# Patient Record
Sex: MALE | Race: WHITE | Employment: FULL TIME | URBAN - METROPOLITAN AREA
[De-identification: names, ages, dates, MRNs, and addresses within clinical notes are randomized per-mention and may not be internally consistent; named-entity substitution may affect disease eponyms.]

---

## 2017-06-15 ENCOUNTER — HOSPITAL ENCOUNTER (OUTPATIENT)
Age: 32
Discharge: HOME | End: 2017-06-15

## 2017-06-15 ENCOUNTER — HOSPITAL ENCOUNTER (OUTPATIENT)
Dept: GENERAL RADIOLOGY | Age: 32
Discharge: HOME | End: 2017-06-15

## 2017-06-15 DIAGNOSIS — Z00.00 PHYSICAL EXAM: ICD-10-CM

## 2017-06-15 LAB
ABSOLUTE EOS #: 0.2 K/UL (ref 0–0.4)
ABSOLUTE LYMPH #: 3.2 K/UL (ref 1–4.8)
ABSOLUTE MONO #: 0.5 K/UL (ref 0.1–1.2)
ALBUMIN SERPL-MCNC: 4.8 G/DL (ref 3.5–5.2)
ALBUMIN/GLOBULIN RATIO: 1.7 (ref 1–2.5)
ALP BLD-CCNC: 44 U/L (ref 40–129)
ALT SERPL-CCNC: 24 U/L (ref 5–41)
ANION GAP SERPL CALCULATED.3IONS-SCNC: 15 MMOL/L (ref 9–17)
AST SERPL-CCNC: 21 U/L
BASOPHILS # BLD: 0 %
BASOPHILS ABSOLUTE: 0 K/UL (ref 0–0.2)
BILIRUB SERPL-MCNC: 0.48 MG/DL (ref 0.3–1.2)
BUN BLDV-MCNC: 15 MG/DL (ref 6–20)
BUN/CREAT BLD: NORMAL (ref 9–20)
CALCIUM SERPL-MCNC: 9.6 MG/DL (ref 8.6–10.4)
CHLORIDE BLD-SCNC: 99 MMOL/L (ref 98–107)
CHOLESTEROL/HDL RATIO: 2.5
CHOLESTEROL: 149 MG/DL
CO2: 26 MMOL/L (ref 20–31)
CREAT SERPL-MCNC: 0.78 MG/DL (ref 0.7–1.2)
DIFFERENTIAL TYPE: NORMAL
EOSINOPHILS RELATIVE PERCENT: 3 %
GFR AFRICAN AMERICAN: >60 ML/MIN
GFR NON-AFRICAN AMERICAN: >60 ML/MIN
GFR SERPL CREATININE-BSD FRML MDRD: NORMAL ML/MIN/{1.73_M2}
GFR SERPL CREATININE-BSD FRML MDRD: NORMAL ML/MIN/{1.73_M2}
GLOBULIN: NORMAL G/DL (ref 1.5–3.8)
GLUCOSE BLD-MCNC: 84 MG/DL (ref 70–99)
HCT VFR BLD CALC: 43.2 % (ref 41–53)
HDLC SERPL-MCNC: 59 MG/DL
HEMOGLOBIN: 15.2 G/DL (ref 13.5–17.5)
IRON: 85 UG/DL (ref 59–158)
LACTATE DEHYDROGENASE: 174 U/L (ref 135–225)
LDL CHOLESTEROL: 78 MG/DL (ref 0–130)
LYMPHOCYTES # BLD: 47 %
MCH RBC QN AUTO: 29.8 PG (ref 26–34)
MCHC RBC AUTO-ENTMCNC: 35.1 G/DL (ref 31–37)
MCV RBC AUTO: 84.9 FL (ref 80–100)
MONOCYTES # BLD: 8 %
PDW BLD-RTO: 13.1 % (ref 12.5–15.4)
PHOSPHORUS: 3.9 MG/DL (ref 2.5–4.5)
PLATELET # BLD: 244 K/UL (ref 140–450)
PLATELET ESTIMATE: NORMAL
PMV BLD AUTO: 7.8 FL (ref 6–12)
POTASSIUM SERPL-SCNC: 3.8 MMOL/L (ref 3.7–5.3)
RBC # BLD: 5.09 M/UL (ref 4.5–5.9)
RBC # BLD: NORMAL 10*6/UL
SEG NEUTROPHILS: 42 %
SEGMENTED NEUTROPHILS ABSOLUTE COUNT: 2.8 K/UL (ref 1.8–7.7)
SODIUM BLD-SCNC: 140 MMOL/L (ref 135–144)
TOTAL PROTEIN: 7.7 G/DL (ref 6.4–8.3)
TRIGL SERPL-MCNC: 60 MG/DL
URIC ACID: 6.3 MG/DL (ref 3.4–7)
VLDLC SERPL CALC-MCNC: NORMAL MG/DL (ref 1–30)
WBC # BLD: 6.7 K/UL (ref 3.5–11)
WBC # BLD: NORMAL 10*3/UL

## 2017-06-15 PROCEDURE — 84550 ASSAY OF BLOOD/URIC ACID: CPT

## 2017-06-15 PROCEDURE — 80061 LIPID PANEL: CPT

## 2017-06-15 PROCEDURE — 86481 TB AG RESPONSE T-CELL SUSP: CPT

## 2017-06-15 PROCEDURE — 84100 ASSAY OF PHOSPHORUS: CPT

## 2017-06-15 PROCEDURE — 83540 ASSAY OF IRON: CPT

## 2017-06-15 PROCEDURE — 83615 LACTATE (LD) (LDH) ENZYME: CPT

## 2017-06-15 PROCEDURE — 85025 COMPLETE CBC W/AUTO DIFF WBC: CPT

## 2017-06-15 PROCEDURE — 71010 XR CHEST LIMITED: CPT

## 2017-06-15 PROCEDURE — 80053 COMPREHEN METABOLIC PANEL: CPT

## 2017-06-18 LAB — T-SPOT TB TEST: NORMAL

## 2018-09-05 ENCOUNTER — HOSPITAL ENCOUNTER (OUTPATIENT)
Age: 33
Discharge: HOME | End: 2018-09-05
Payer: COMMERCIAL

## 2018-09-05 LAB
FOLLICLE STIMULATING HORMONE: 3.8 U/L (ref 1.5–12.4)
LH: 5.3 U/L (ref 1.7–8.6)
PROLACTIN: 5.36 UG/L (ref 4.04–15.2)
SEX HORMONE BINDING GLOBULIN: 33 NMOL/L (ref 11–80)
TESTOSTERONE FREE-NONMALE: 51.5 PG/ML (ref 47–244)
TESTOSTERONE TOTAL: 265 NG/DL (ref 220–1000)
TSH SERPL DL<=0.05 MIU/L-ACNC: 1.6 MIU/L (ref 0.3–5)

## 2018-09-05 PROCEDURE — 83002 ASSAY OF GONADOTROPIN (LH): CPT

## 2018-09-05 PROCEDURE — 84146 ASSAY OF PROLACTIN: CPT

## 2018-09-05 PROCEDURE — 36415 COLL VENOUS BLD VENIPUNCTURE: CPT

## 2018-09-05 PROCEDURE — 84443 ASSAY THYROID STIM HORMONE: CPT

## 2018-09-05 PROCEDURE — 84270 ASSAY OF SEX HORMONE GLOBUL: CPT

## 2018-09-05 PROCEDURE — 83001 ASSAY OF GONADOTROPIN (FSH): CPT

## 2018-09-05 PROCEDURE — 84403 ASSAY OF TOTAL TESTOSTERONE: CPT

## 2018-11-25 ENCOUNTER — HOSPITAL ENCOUNTER (EMERGENCY)
Age: 33
Discharge: HOME | End: 2018-11-25
Attending: EMERGENCY MEDICINE
Payer: COMMERCIAL

## 2018-11-25 VITALS
TEMPERATURE: 98.1 F | HEART RATE: 77 BPM | SYSTOLIC BLOOD PRESSURE: 134 MMHG | DIASTOLIC BLOOD PRESSURE: 87 MMHG | OXYGEN SATURATION: 96 % | RESPIRATION RATE: 20 BRPM

## 2018-11-25 DIAGNOSIS — S06.0X0A CONCUSSION WITHOUT LOSS OF CONSCIOUSNESS, INITIAL ENCOUNTER: ICD-10-CM

## 2018-11-25 DIAGNOSIS — S16.1XXA STRAIN OF NECK MUSCLE, INITIAL ENCOUNTER: ICD-10-CM
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
## 2018-11-25 DIAGNOSIS — S80.12XA CONTUSION OF LEFT CALF, INITIAL ENCOUNTER: ICD-10-CM

## 2018-11-25 PROCEDURE — 99283 EMERGENCY DEPT VISIT LOW MDM: CPT

## 2018-11-25 PROCEDURE — 90715 TDAP VACCINE 7 YRS/> IM: CPT | Performed by: EMERGENCY MEDICINE

## 2018-11-25 PROCEDURE — 6360000002 HC RX W HCPCS: Performed by: EMERGENCY MEDICINE

## 2018-11-25 PROCEDURE — 90471 IMMUNIZATION ADMIN: CPT | Performed by: EMERGENCY MEDICINE

## 2018-11-25 PROCEDURE — 96372 THER/PROPH/DIAG INJ SC/IM: CPT

## 2018-11-25 RX ORDER — KETOROLAC TROMETHAMINE 30 MG/ML
30 INJECTION, SOLUTION INTRAMUSCULAR; INTRAVENOUS ONCE
Status: COMPLETED | OUTPATIENT
Start: 2018-11-25 | End: 2018-11-25

## 2018-11-25 RX ADMIN — KETOROLAC TROMETHAMINE 30 MG: 30 INJECTION, SOLUTION INTRAMUSCULAR at 01:05

## 2018-11-25 RX ADMIN — TETANUS TOXOID, REDUCED DIPHTHERIA TOXOID AND ACELLULAR PERTUSSIS VACCINE, ADSORBED 0.5 ML: 5; 2.5; 8; 8; 2.5 SUSPENSION INTRAMUSCULAR at 02:11

## 2018-11-25 ASSESSMENT — ENCOUNTER SYMPTOMS
VOMITING: 0
SHORTNESS OF BREATH: 0
DIARRHEA: 0
ABDOMINAL PAIN: 0
EYE PAIN: 0
SORE THROAT: 0
COUGH: 0
EYE DISCHARGE: 0
NAUSEA: 0

## 2018-11-25 ASSESSMENT — PAIN DESCRIPTION - LOCATION: LOCATION: CHEST

## 2018-11-25 ASSESSMENT — PAIN SCALES - GENERAL
PAINLEVEL_OUTOF10: 5
PAINLEVEL_OUTOF10: 5

## 2018-11-25 ASSESSMENT — PAIN DESCRIPTION - PAIN TYPE: TYPE: ACUTE PAIN

## 2018-11-25 ASSESSMENT — PAIN DESCRIPTION - DESCRIPTORS: DESCRIPTORS: ACHING

## 2018-11-25 ASSESSMENT — PAIN DESCRIPTION - ORIENTATION: ORIENTATION: MID

## 2018-11-25 NOTE — PROGRESS NOTES
I was assigned to this patient in error. I did not see this patient or participate in their care.     Dahlia Lew DO  11/25/2018 12:42 AM
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
## 2020-04-18 ENCOUNTER — HOSPITAL ENCOUNTER (EMERGENCY)
Age: 35
Discharge: HOME | End: 2020-04-18
Attending: EMERGENCY MEDICINE
Payer: COMMERCIAL

## 2020-04-18 ENCOUNTER — APPOINTMENT (OUTPATIENT)
Dept: GENERAL RADIOLOGY | Age: 35
End: 2020-04-18
Payer: COMMERCIAL

## 2020-04-18 VITALS
HEIGHT: 70 IN | RESPIRATION RATE: 16 BRPM | BODY MASS INDEX: 28.63 KG/M2 | TEMPERATURE: 98.9 F | OXYGEN SATURATION: 100 % | HEART RATE: 94 BPM | SYSTOLIC BLOOD PRESSURE: 175 MMHG | WEIGHT: 200 LBS | DIASTOLIC BLOOD PRESSURE: 105 MMHG

## 2020-04-18 PROCEDURE — 73562 X-RAY EXAM OF KNEE 3: CPT

## 2020-04-18 PROCEDURE — 6370000000 HC RX 637 (ALT 250 FOR IP): Performed by: EMERGENCY MEDICINE

## 2020-04-18 PROCEDURE — 99283 EMERGENCY DEPT VISIT LOW MDM: CPT

## 2020-04-18 RX ORDER — ACETAMINOPHEN 325 MG/1
650 TABLET ORAL ONCE
Status: COMPLETED | OUTPATIENT
Start: 2020-04-18 | End: 2020-04-18

## 2020-04-18 RX ORDER — IBUPROFEN 800 MG/1
800 TABLET ORAL ONCE
Status: COMPLETED | OUTPATIENT
Start: 2020-04-18 | End: 2020-04-18

## 2020-04-18 RX ORDER — IBUPROFEN 800 MG/1
800 TABLET ORAL EVERY 8 HOURS PRN
Qty: 30 TABLET | Refills: 0 | Status: SHIPPED | OUTPATIENT
Start: 2020-04-18

## 2020-04-18 RX ADMIN — IBUPROFEN 800 MG: 800 TABLET, FILM COATED ORAL at 22:09

## 2020-04-18 RX ADMIN — ACETAMINOPHEN 650 MG: 325 TABLET ORAL at 22:09

## 2020-04-18 SDOH — HEALTH STABILITY: MENTAL HEALTH: HOW OFTEN DO YOU HAVE A DRINK CONTAINING ALCOHOL?: NEVER

## 2020-04-18 ASSESSMENT — ENCOUNTER SYMPTOMS
COUGH: 0
ABDOMINAL PAIN: 0
SORE THROAT: 0

## 2020-04-18 ASSESSMENT — PAIN DESCRIPTION - LOCATION: LOCATION: KNEE

## 2020-04-18 ASSESSMENT — PAIN SCALES - GENERAL
PAINLEVEL_OUTOF10: 2
PAINLEVEL_OUTOF10: 8

## 2020-04-19 NOTE — ED NOTES
Writer gave pt ace wrap and crutches   Pt tolerated   Pt left with co worker   Will continue to assess      Caden Huerta RN  04/18/20 [id number]

## 2020-04-19 NOTE — ED NOTES
Bed: 14  Expected date:   Expected time:   Means of arrival:   Comments:     Michelle Ferrell RN  04/18/20 [id number]

## 2020-04-19 NOTE — ED NOTES
MARYANN co workers at [facility], [address] [place] [address]  04/18/20 Dolores Gonzalez
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
## 2024-09-22 ENCOUNTER — OFFICE VISIT (OUTPATIENT)
Dept: FAMILY MEDICINE CLINIC | Age: 39
End: 2024-09-22
Payer: COMMERCIAL

## 2024-09-22 VITALS
SYSTOLIC BLOOD PRESSURE: 127 MMHG | TEMPERATURE: 98.1 F | OXYGEN SATURATION: 98 % | DIASTOLIC BLOOD PRESSURE: 82 MMHG | HEART RATE: 88 BPM | BODY MASS INDEX: 30.06 KG/M2 | WEIGHT: 210 LBS | HEIGHT: 70 IN

## 2024-09-22 DIAGNOSIS — K52.9 ACUTE GASTROENTERITIS: Primary | ICD-10-CM

## 2024-09-22 PROCEDURE — 99203 OFFICE O/P NEW LOW 30 MIN: CPT | Performed by: FAMILY MEDICINE
